# Patient Record
Sex: MALE | Race: BLACK OR AFRICAN AMERICAN | NOT HISPANIC OR LATINO | URBAN - METROPOLITAN AREA
[De-identification: names, ages, dates, MRNs, and addresses within clinical notes are randomized per-mention and may not be internally consistent; named-entity substitution may affect disease eponyms.]

---

## 2023-10-11 ENCOUNTER — LAB (OUTPATIENT)
Dept: LAB | Facility: LAB | Age: 28
End: 2023-10-11

## 2023-10-11 DIAGNOSIS — Z84.89 FAMILY HISTORY OF OTHER SPECIFIED CONDITIONS: Primary | ICD-10-CM

## 2023-10-11 PROCEDURE — 36415 COLL VENOUS BLD VENIPUNCTURE: CPT

## 2024-11-20 ENCOUNTER — OFFICE VISIT (OUTPATIENT)
Dept: URGENT CARE | Age: 29
End: 2024-11-20
Payer: COMMERCIAL

## 2024-11-20 ENCOUNTER — ANCILLARY PROCEDURE (OUTPATIENT)
Dept: URGENT CARE | Age: 29
End: 2024-11-20
Payer: COMMERCIAL

## 2024-11-20 VITALS
SYSTOLIC BLOOD PRESSURE: 137 MMHG | HEART RATE: 68 BPM | BODY MASS INDEX: 26.82 KG/M2 | RESPIRATION RATE: 18 BRPM | OXYGEN SATURATION: 96 % | DIASTOLIC BLOOD PRESSURE: 82 MMHG | WEIGHT: 209 LBS | HEIGHT: 74 IN | TEMPERATURE: 97 F

## 2024-11-20 DIAGNOSIS — R05.1 ACUTE COUGH: Primary | ICD-10-CM

## 2024-11-20 DIAGNOSIS — R05.1 ACUTE COUGH: ICD-10-CM

## 2024-11-20 DIAGNOSIS — J18.9 PNEUMONIA DUE TO INFECTIOUS ORGANISM, UNSPECIFIED LATERALITY, UNSPECIFIED PART OF LUNG: ICD-10-CM

## 2024-11-20 PROCEDURE — 71046 X-RAY EXAM CHEST 2 VIEWS: CPT | Performed by: NURSE PRACTITIONER

## 2024-11-20 RX ORDER — AMOXICILLIN AND CLAVULANATE POTASSIUM 875; 125 MG/1; MG/1
1 TABLET, FILM COATED ORAL 2 TIMES DAILY
Qty: 10 TABLET | Refills: 0 | Status: SHIPPED | OUTPATIENT
Start: 2024-11-20 | End: 2024-11-25

## 2024-11-20 RX ORDER — AZITHROMYCIN 250 MG/1
TABLET, FILM COATED ORAL
Qty: 6 TABLET | Refills: 0 | Status: SHIPPED | OUTPATIENT
Start: 2024-11-20 | End: 2024-11-25

## 2024-11-20 RX ORDER — BENZONATATE 200 MG/1
200 CAPSULE ORAL 3 TIMES DAILY PRN
Qty: 30 CAPSULE | Refills: 0 | Status: SHIPPED | OUTPATIENT
Start: 2024-11-20 | End: 2024-12-20

## 2024-11-20 RX ORDER — ALBUTEROL SULFATE 90 UG/1
2 INHALANT RESPIRATORY (INHALATION) EVERY 6 HOURS PRN
Qty: 18 G | Refills: 0 | Status: SHIPPED | OUTPATIENT
Start: 2024-11-20 | End: 2025-11-20

## 2024-11-20 ASSESSMENT — ENCOUNTER SYMPTOMS
FATIGUE: 1
COUGH: 1
CHEST TIGHTNESS: 1
SHORTNESS OF BREATH: 1
SINUS PAIN: 1
DIAPHORESIS: 1
RHINORRHEA: 1
FEVER: 1
CHILLS: 1
SINUS PRESSURE: 1

## 2024-11-20 NOTE — PROGRESS NOTES
"Subjective   Patient ID: Thomas Kan is a 29 y.o. male. They present today with a chief complaint of Cough (Congestion x 2 weeks).    History of Present Illness  Thomas Kan is a 29 y.o. male who presents to the clinic for cough, chest congestion, fevers, out of breath with exertion, sinus pressure, ear pain, chills  for the past 2 weeks.  Pt denies any chest pain, sob, N/V at this time in clinic.             Past Medical History  Allergies as of 11/20/2024    (No Known Allergies)       (Not in a hospital admission)       No past medical history on file.    No past surgical history on file.         Review of Systems  Review of Systems   Constitutional:  Positive for chills, diaphoresis, fatigue and fever.   HENT:  Positive for congestion, rhinorrhea, sinus pressure and sinus pain.    Respiratory:  Positive for cough, chest tightness and shortness of breath.    All other systems reviewed and are negative.                                 Objective    Vitals:    11/20/24 1227   BP: 137/82   BP Location: Left arm   Patient Position: Sitting   BP Cuff Size: Large adult   Pulse: 68   Resp: 18   Temp: 36.1 °C (97 °F)   SpO2: 96%   Weight: 94.8 kg (209 lb)   Height: 1.88 m (6' 2\")     No LMP for male patient.    Physical Exam  Constitutional:       Appearance: Normal appearance.   HENT:      Right Ear: Tympanic membrane normal.      Left Ear: Tympanic membrane normal.   Cardiovascular:      Rate and Rhythm: Normal rate and regular rhythm.   Pulmonary:      Breath sounds: Rhonchi present.      Comments: LLL  Neurological:      General: No focal deficit present.      Mental Status: He is alert and oriented to person, place, and time. Mental status is at baseline.   Psychiatric:         Mood and Affect: Mood normal.         Behavior: Behavior normal.         Procedures    Point of Care Test & Imaging Results from this visit  No results found for this visit on 11/20/24.   XR chest 2 views    Result Date: " 11/20/2024  Interpreted By:  Evelia Beatty, STUDY: Chest, 2 views.   INDICATION: Signs/Symptoms:cough.   COMPARISON: None.   ACCESSION NUMBER(S): [id number]   ORDERING CLINICIAN: FARAZ LEON   FINDINGS: The cardiomediastinal silhouette size is within normal limits.   There is no focal consolidation, edema or pneumothorax. No sizeable pleural effusion. Prominent Schmorl's nodes noted in the inferior aspect of the lower thoracic vertebral bodies.       1. No acute cardiopulmonary process.   MACRO: None.   Signed by: Evelia Beatty 11/20/2024 2:30 PM Dictation workstation:   [id number]     Diagnostic study results (if any) were reviewed by NITHIN Pope-CNP.    Assessment/Plan   Allergies, medications, history, and pertinent labs/EKGs/Imaging reviewed by YANET Pope.     Medical Decision Making  Signs, symptoms, examination, and my interpretation of XRAY imaging consistent with community acquired pneumonia. No evidence of sepsis or acute respiratory distress at this time. Will treat with appropriate antibiotics for age group/risk factors. Patient is advised if symptoms change or worsen go to ED for further evaluation and care. Otherwise follow-up with family doctor for recheck within 5-7 days    Orders and Diagnoses  Diagnoses and all orders for this visit:  Acute cough  -     XR chest 2 views; Future  Pneumonia due to infectious organism, unspecified laterality, unspecified part of lung  -     amoxicillin-pot clavulanate (Augmentin) 875-125 mg tablet; Take 1 tablet by mouth 2 times a day for 5 days.  -     azithromycin (Zithromax) 250 mg tablet; Take 2 tabs (500 mg) by mouth today, than 1 daily for 4 days.  -     benzonatate (Tessalon) 200 mg capsule; Take 1 capsule (200 mg) by mouth 3 times a day as needed for cough. Do not crush or chew.  -     albuterol 90 mcg/actuation inhaler; Inhale 2 puffs every 6 hours if needed for wheezing.      Medical Admin Record      Patient disposition:  Home    Electronically signed by Yvon Mitchell, APRN-CNP  2:39 PM

## 2024-12-11 ENCOUNTER — OFFICE VISIT (OUTPATIENT)
Dept: URGENT CARE | Age: 29
End: 2024-12-11
Payer: COMMERCIAL

## 2024-12-11 VITALS
TEMPERATURE: 96.2 F | DIASTOLIC BLOOD PRESSURE: 113 MMHG | RESPIRATION RATE: 18 BRPM | HEART RATE: 64 BPM | WEIGHT: 211 LBS | HEIGHT: 75 IN | BODY MASS INDEX: 26.24 KG/M2 | SYSTOLIC BLOOD PRESSURE: 145 MMHG | OXYGEN SATURATION: 98 %

## 2024-12-11 DIAGNOSIS — T26.92XA CHEMICAL BURN OF LEFT EYE: Primary | ICD-10-CM

## 2024-12-11 PROCEDURE — 3008F BODY MASS INDEX DOCD: CPT | Performed by: PHYSICIAN ASSISTANT

## 2024-12-11 PROCEDURE — 99215 OFFICE O/P EST HI 40 MIN: CPT | Performed by: PHYSICIAN ASSISTANT

## 2024-12-11 NOTE — PROGRESS NOTES
"Subjective   Patient ID: Thomas Kan is a 29 y.o. male. They present today with a chief complaint of Eye Trauma (Cement in eye).    History of Present Illness  Patient is a pleasant 29-year-old white male, no significant past medical history, presenting to the clinic with chief complaint of cement in his eye.  Patient states he was using powder cement at home and was dumping the bag into a bucket when the bucket fell causing the cement dust to fly into the air and into his left eye.  States he irrigated the eye at home for couple of minutes reported to clinic today for further evaluation.  He is reporting moderate amount of pain associated with the left eye described as a burning.  States it is very red.  States he had a very hard time stating at first however states that has improved.  He denies any swelling of the eye.  Denies any photophobia or light sensitivity.  No other injuries.  No further complaints.      Eye Trauma      Past Medical History  Allergies as of 12/11/2024    (No Known Allergies)       (Not in a hospital admission)         No past medical history on file.    No past surgical history on file.         Review of Systems  Review of Systems                               Objective    Vitals:    12/11/24 1029   BP: (!) 145/113   BP Location: Left arm   Patient Position: Sitting   BP Cuff Size: Large adult   Pulse: 64   Resp: 18   Temp: 35.7 °C (96.2 °F)   SpO2: 98%   Weight: 95.7 kg (211 lb)   Height: 1.905 m (6' 3\")     No LMP for male patient.    Physical Exam  General: Vitals Noted. No distress. Normocephalic.     HEENT: TMs normal, EOMI, patent nares, Normal OP.  Evaluation of the left eye reveals marked conjunctival injection with copious amounts of clear drainage from the eye.  Pupils are equal round reactive to light with intact EOMs without nystagmus.    Neck: Supple with no adenopathy.     Cardiac: Regular Rate and Rhythm. No murmur.     Pulmonary: Equal breath sounds bilaterally. No " wheezes, rhonchi, or rales.    Abdomen: Soft, non-tender, with normal bowel sounds.     Musculoskeletal: Moves all extremities, no effusion, no edema.     Skin: No obvious rashes.  Procedures    Point of Care Test & Imaging Results from this visit    No results found.    Diagnostic study results (if any) were reviewed by Chato Hollis PA-C.    Assessment/Plan   Allergies, medications, history, and pertinent labs/EKGs/Imaging reviewed by Chato Hollis PA-C.     Medical Decision Making  Patient was seen eval in the clinic with a complaint of getting cement to his left eye.  On exam patient is nontoxic well-appearing respect comfortably no acute distress.  Vital signs are stable, afebrile.  Chest is clear, heart is regular, belly soft and nontender.  Vaginal left eye as above.  Considering alcohol energy of cement I feel patient warrants further evaluation in the department to assess for chemical burns of the eye.  I also do not have litmus paper to assess for pH of the eye and I feel patient will be better served in the emergency department with more resources and a potential ophthalmology consultation for further evaluation of his eye.  I had a very extensive conversation with the patient regarding my impression plan and reasons report to the ED and he expresses understanding and agreement with this plan.    Orders and Diagnoses  There are no diagnoses linked to this encounter.      Medical Admin Record      Follow Up Instructions  No follow-ups on file.    Patient disposition: ED    Electronically signed by Chato Hollis PA-C  10:49 AM